# Patient Record
Sex: MALE | Race: WHITE | NOT HISPANIC OR LATINO | Employment: FULL TIME | URBAN - METROPOLITAN AREA
[De-identification: names, ages, dates, MRNs, and addresses within clinical notes are randomized per-mention and may not be internally consistent; named-entity substitution may affect disease eponyms.]

---

## 2021-10-13 ENCOUNTER — CLINICAL SUPPORT (OUTPATIENT)
Dept: OTHER | Facility: CLINIC | Age: 26
End: 2021-10-13
Payer: COMMERCIAL

## 2021-10-13 DIAGNOSIS — Z00.8 ENCOUNTER FOR OTHER GENERAL EXAMINATION: ICD-10-CM

## 2021-10-14 VITALS — HEIGHT: 67 IN

## 2021-10-14 LAB
GLUCOSE SERPL-MCNC: 105 MG/DL (ref 60–140)
HDLC SERPL-MCNC: 30 MG/DL
POC CHOLESTEROL, LDL (DOCK): 114 MG/DL
POC CHOLESTEROL, TOTAL: 222 MG/DL
TRIGL SERPL-MCNC: 388 MG/DL

## 2021-11-05 ENCOUNTER — OFFICE VISIT (OUTPATIENT)
Dept: FAMILY MEDICINE | Facility: CLINIC | Age: 26
End: 2021-11-05
Payer: COMMERCIAL

## 2021-11-05 ENCOUNTER — TELEPHONE (OUTPATIENT)
Dept: FAMILY MEDICINE | Facility: CLINIC | Age: 26
End: 2021-11-05
Payer: COMMERCIAL

## 2021-11-05 VITALS
DIASTOLIC BLOOD PRESSURE: 89 MMHG | OXYGEN SATURATION: 98 % | HEART RATE: 112 BPM | HEIGHT: 67 IN | WEIGHT: 198.5 LBS | SYSTOLIC BLOOD PRESSURE: 134 MMHG | RESPIRATION RATE: 18 BRPM | BODY MASS INDEX: 31.16 KG/M2

## 2021-11-05 DIAGNOSIS — E78.1 HYPERTRIGLYCERIDEMIA: ICD-10-CM

## 2021-11-05 DIAGNOSIS — Z00.00 ANNUAL PHYSICAL EXAM: Primary | ICD-10-CM

## 2021-11-05 PROCEDURE — 1159F MED LIST DOCD IN RCRD: CPT | Mod: CPTII,S$GLB,, | Performed by: FAMILY MEDICINE

## 2021-11-05 PROCEDURE — 3075F SYST BP GE 130 - 139MM HG: CPT | Mod: CPTII,S$GLB,, | Performed by: FAMILY MEDICINE

## 2021-11-05 PROCEDURE — 3079F DIAST BP 80-89 MM HG: CPT | Mod: CPTII,S$GLB,, | Performed by: FAMILY MEDICINE

## 2021-11-05 PROCEDURE — 1159F PR MEDICATION LIST DOCUMENTED IN MEDICAL RECORD: ICD-10-PCS | Mod: CPTII,S$GLB,, | Performed by: FAMILY MEDICINE

## 2021-11-05 PROCEDURE — 99385 PREV VISIT NEW AGE 18-39: CPT | Mod: S$GLB,,, | Performed by: FAMILY MEDICINE

## 2021-11-05 PROCEDURE — 3079F PR MOST RECENT DIASTOLIC BLOOD PRESSURE 80-89 MM HG: ICD-10-PCS | Mod: CPTII,S$GLB,, | Performed by: FAMILY MEDICINE

## 2021-11-05 PROCEDURE — 3008F PR BODY MASS INDEX (BMI) DOCUMENTED: ICD-10-PCS | Mod: CPTII,S$GLB,, | Performed by: FAMILY MEDICINE

## 2021-11-05 PROCEDURE — 99385 PR PREVENTIVE VISIT,NEW,18-39: ICD-10-PCS | Mod: S$GLB,,, | Performed by: FAMILY MEDICINE

## 2021-11-05 PROCEDURE — 99999 PR PBB SHADOW E&M-EST. PATIENT-LVL III: CPT | Mod: PBBFAC,,, | Performed by: FAMILY MEDICINE

## 2021-11-05 PROCEDURE — 99999 PR PBB SHADOW E&M-EST. PATIENT-LVL III: ICD-10-PCS | Mod: PBBFAC,,, | Performed by: FAMILY MEDICINE

## 2021-11-05 PROCEDURE — 3075F PR MOST RECENT SYSTOLIC BLOOD PRESS GE 130-139MM HG: ICD-10-PCS | Mod: CPTII,S$GLB,, | Performed by: FAMILY MEDICINE

## 2021-11-05 PROCEDURE — 3008F BODY MASS INDEX DOCD: CPT | Mod: CPTII,S$GLB,, | Performed by: FAMILY MEDICINE

## 2021-11-05 RX ORDER — MULTIVIT WITH IRON,MINERALS
100 TABLET ORAL NIGHTLY
COMMUNITY

## 2021-11-05 RX ORDER — ICOSAPENT ETHYL 1000 MG/1
2 CAPSULE ORAL 2 TIMES DAILY
Qty: 360 CAPSULE | Refills: 2 | Status: SHIPPED | OUTPATIENT
Start: 2021-11-05

## 2021-11-12 DIAGNOSIS — Z11.59 NEED FOR HEPATITIS C SCREENING TEST: ICD-10-CM

## 2023-03-20 ENCOUNTER — TELEPHONE (OUTPATIENT)
Dept: FAMILY MEDICINE | Facility: CLINIC | Age: 28
End: 2023-03-20
Payer: COMMERCIAL

## 2023-03-20 DIAGNOSIS — Z31.69 INFERTILITY COUNSELING: Primary | ICD-10-CM

## 2023-03-20 NOTE — TELEPHONE ENCOUNTER
----- Message from Prince Gonzalez sent at 3/20/2023  1:44 PM CDT -----  Contact: Da from OBGYN office at  Type: Needs Medical Advice  Who Called:  Da from OBGYN office  Best Call Back Number: [phone]  Additional Information: Da is calling the office to have a referral put in for a semen analysis. Please call back to advise Thanks

## 2023-10-03 ENCOUNTER — PATIENT MESSAGE (OUTPATIENT)
Dept: ADMINISTRATIVE | Facility: HOSPITAL | Age: 28
End: 2023-10-03
Payer: COMMERCIAL

## 2023-10-17 ENCOUNTER — PATIENT MESSAGE (OUTPATIENT)
Dept: ADMINISTRATIVE | Facility: HOSPITAL | Age: 28
End: 2023-10-17
Payer: COMMERCIAL

## 2024-06-21 ENCOUNTER — OFFICE VISIT (OUTPATIENT)
Dept: UROLOGY | Facility: CLINIC | Age: 29
End: 2024-06-21
Payer: COMMERCIAL

## 2024-06-21 VITALS — BODY MASS INDEX: 28.25 KG/M2 | WEIGHT: 180 LBS | HEIGHT: 67 IN

## 2024-06-21 DIAGNOSIS — Z30.09 VASECTOMY EVALUATION: Primary | ICD-10-CM

## 2024-06-21 PROCEDURE — 99999 PR PBB SHADOW E&M-EST. PATIENT-LVL II: CPT | Mod: PBBFAC,,, | Performed by: UROLOGY

## 2024-06-21 RX ORDER — ROSUVASTATIN CALCIUM 5 MG/1
5 TABLET, COATED ORAL NIGHTLY
COMMUNITY
Start: 2024-06-04 | End: 2025-06-04

## 2024-06-21 NOTE — PROGRESS NOTES
"[facility] New Patient/H&P:    Sj Munroe is a 28 y.o. male who presents for vasectomy evaluation.    Patient is  with 2 kids - ages 4 and 1.5 months. Does not desire more children.     Understands this is a permanent procedure.     Works as a .     Denies any fever, chills, gross hematuria, flank pain, bone pain, unintentional weight loss,  trauma or history of  malignancy.       History reviewed. No pertinent past medical history.    Past Surgical History:   Procedure Laterality Date    DENTAL SURGERY         Family History   Problem Relation Name Age of Onset    Hypertension Father      Arthritis Paternal Grandfather         Review of patient's allergies indicates:  No Known Allergies    Medications Reviewed: see MAR      FOCUSED PHYSICAL EXAM:    There were no vitals filed for this visit.  Body mass index is 28.19 kg/m². Weight: 81.6 kg (180 lb) Height: 5' 7" (170.2 cm)       General: Alert, cooperative, no distress, appears stated age  Abdomen: Soft, non-tender, no CVA tenderness, non-distended  : Circumcised, normal phallus without plaques/lesions, bilaterally descended testicles without lesions, bilateral vas palpated      LABS:    No results found for this or any previous visit (from the past 336 hour(s)).      Assessment/Diagnosis:    1. Vasectomy evaluation  Sperm Count, Post Vasectomy          Plans:    - I spent 30 minutes of the day of this encounter preparing for, treating and managing this patient. Extensive discussion with patient regarding the risks, benefits and alternatives to bilateral vasectomy. Patient verbalizes understanding and wishes to proceed. Explained that this is a permanent procedure and will result in infertility.   - Scheduled for bilateral vasectomy in clinic next available. All ishmael-procedural instructions given to patient. Instructed to call the office should he have any additional questions prior to his procedure.   - Does not want a " Valium RX.

## 2024-07-09 ENCOUNTER — PATIENT MESSAGE (OUTPATIENT)
Dept: ADMINISTRATIVE | Facility: HOSPITAL | Age: 29
End: 2024-07-09
Payer: COMMERCIAL

## 2024-07-25 ENCOUNTER — PROCEDURE VISIT (OUTPATIENT)
Dept: UROLOGY | Facility: CLINIC | Age: 29
End: 2024-07-25
Payer: COMMERCIAL

## 2024-07-25 VITALS — HEIGHT: 67 IN | BODY MASS INDEX: 28.19 KG/M2

## 2024-07-25 DIAGNOSIS — Z30.2 ENCOUNTER FOR STERILIZATION: Primary | ICD-10-CM

## 2024-07-25 PROCEDURE — 55250 REMOVAL OF SPERM DUCT(S): CPT | Mod: S$GLB,,, | Performed by: UROLOGY

## 2024-07-25 RX ORDER — TRAMADOL HYDROCHLORIDE 50 MG/1
50 TABLET ORAL EVERY 6 HOURS PRN
Qty: 7 TABLET | Refills: 0 | Status: SHIPPED | OUTPATIENT
Start: 2024-07-25 | End: 2024-07-28

## 2024-07-25 RX ORDER — CEPHALEXIN 500 MG/1
500 CAPSULE ORAL EVERY 8 HOURS
Qty: 9 CAPSULE | Refills: 0 | Status: SHIPPED | OUTPATIENT
Start: 2024-07-25 | End: 2024-07-28

## 2024-07-25 NOTE — PROCEDURES
VASECTOMY Procedure/Discharge Note    Surgery Date:  7/25/2024    NAME:Sj Munroe    SURGEON: Dr. Kayleen Gonzalez    ASSISTANT: None    DIAGNOSIS: Desired sterilization.    OPERATION: Bilateral vastectomy.    TECHNIQUE: The patient was induced with local anesthesia. 2 incisions were then made overlying vas in the upper lateral scrotum. Ring clamp used to isolate vas deferens and perivasal tissue dissected free with sharp hemostat and metzenbaum scissors until only vas deferens isolated by ring forceps. Vasculature inferior dissected free and an approximate 1 inch segment was isolated with 2 hemostats, and transected with bovie electrocautery which was used as well to cauterize the stump lumen. 2-0 chromic stick tie used to suture ligate the stump. Assessed for bleeding, spot cautery used, and noted to be hemostatic. Skin incisions closed with 4-0 Monocryl horizontal mattress suture x2.    ANESTHESIA: Lidocaine 1%  FINDINGS: Normal vas.  COMPLICATIONS: None  PRECAUTION DISCUSSED: Rest, ice pack, no ejaculation for 3 days, activity restrictions, and protected intercourse until 1 negative semenalysis.    POST OP MEDICATIONS:  Keflex 500 mg p.o. t.i.d. and Tramadol 50mg Q6 hours prn pain.  RTC PRN.     Disposition: Home    Discharge home today status post uncomplicated procedure as above  Diet - resume home diet  Follow up: RTC PRN. Patient given specimen cups preoperatively. Instructed to return to lab in 6 and 8 weeks for post-vasectomy semen analysis.   Instructions: Rest, ice pack, no ejaculation for 3 days, activity restrictions, and protected intercourse until 1 negative semenalysis.  Meds:     Medication List            Accurate as of July 25, 2024  8:54 AM. If you have any questions, ask your nurse or doctor.                START taking these medications      cephALEXin 500 MG capsule  Commonly known as: KEFLEX  Take 1 capsule (500 mg total) by mouth every 8 (eight) hours. for 3 days  Started by: Kayleen Gonzalez Jr,  MD     traMADoL 50 mg tablet  Commonly known as: ULTRAM  Take 1 tablet (50 mg total) by mouth every 6 (six) hours as needed for Pain.  Started by: Kayleen Gonzalez Jr, MD            CONTINUE taking these medications      icosapent ethyL 1 gram Cap  Commonly known as: VASCEPA  Take 2 capsules (2 g total) by mouth 2 (two) times daily.     niacin 100 MG Tab     OMEGA-3 2100 ORAL     rosuvastatin 5 MG tablet  Commonly known as: CRESTOR               Where to Get Your Medications        These medications were sent to [facility] [place] [place] - [address]  [address] [place] [address]      Phone: [phone]   cephALEXin 500 MG capsule  traMADoL 50 mg tablet         Kayleen Gonzalez Jr, MD